# Patient Record
Sex: FEMALE | Race: WHITE | Employment: PART TIME | URBAN - METROPOLITAN AREA
[De-identification: names, ages, dates, MRNs, and addresses within clinical notes are randomized per-mention and may not be internally consistent; named-entity substitution may affect disease eponyms.]

---
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
## 2023-02-08 ENCOUNTER — TELEPHONE (OUTPATIENT)
Dept: OBGYN CLINIC | Age: 18
End: 2023-02-08

## 2023-02-08 RX ORDER — NORETHINDRONE ACETATE AND ETHINYL ESTRADIOL 1MG-20(21)
1 KIT ORAL DAILY
Qty: 3 DOSE PACK | Refills: 0 | Status: SHIPPED | OUTPATIENT
Start: 2023-02-08

## 2023-02-08 NOTE — TELEPHONE ENCOUNTER
Pt called name and  verified. Pt needs birth control prior till her annual exam on 2023 per md order ok to send.

## 2023-02-23 NOTE — PROGRESS NOTES
Annual Exam    Lorena Randall is a 21 y.o. G0 presenting for annual exam.     History of dysmenorrhea, significantly improved since starting Junel OCPs. Pt would like to continue this method. Patient with shorter, light menses and no cramping since starting ocp's. No other concerns today. 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 Has become more health, diet, and exercise conscious. Ob/Gyn Hx:  G0   LMP-2/7/23  Menarche- age 10  Menses- regular   Contraception-Junel OCP  STI- denies  ? SA- never     Health maintenance:  Gardasil- considering, discussed in detail previously      Past Medical History:   Diagnosis Date    Dysmenorrhea in adolescent        No past surgical history on file.     Family History   Problem Relation Age of Onset    No Known Problems Mother     No Known Problems Father     Hypertension Maternal Uncle     Heart Disease Maternal Grandmother        Social History     Socioeconomic History    Marital status: SINGLE     Spouse name: Not on file    Number of children: Not on file    Years of education: Not on file    Highest education level: Not on file   Occupational History    Not on file   Tobacco Use    Smoking status: Never    Smokeless tobacco: Never   Substance and Sexual Activity    Alcohol use: No    Drug use: No    Sexual activity: Never   Other Topics Concern    Not on file   Social History Narrative    Home schooled - 9th grade in 2018        Rides horses, does barrel racing, has 2 horses, one named Bug     Social Determinants of Health     Financial Resource Strain: Not on file   Food Insecurity: Not on file   Transportation Needs: Not on file   Physical Activity: Not on file   Stress: Not on file   Social Connections: Not on file   Intimate Partner Violence: Not on file   Housing Stability: Not on file       Current Outpatient Medications   Medication Sig Dispense Refill    norethindrone-ethinyl estradiol (Aurovela Fe 1-20, 28,) 1 mg-20 mcg (21)/75 mg (7) tab Take 1 Tablet by mouth daily. 3 Dose Pack 0    ibuprofen (MOTRIN) 200 mg tablet Take  by mouth. (Patient not taking: Reported on 12/7/2021)         Allergies   Allergen Reactions    Tree Nut Hives and Swelling       Review of Systems - History obtained from the patient  Constitutional: negative for weight loss, fever, night sweats  HEENT: negative for hearing loss, earache, congestion, snoring, sorethroat  CV: negative for chest pain, palpitations, edema  Resp: negative for cough, shortness of breath, wheezing  GI: negative for change in bowel habits, abdominal pain, black or bloody stools  : negative for frequency, dysuria, hematuria, vaginal discharge, +dysmenorrhea, improved with OCPs  MSK: negative for back pain, joint pain, muscle pain  Breast: negative for breast lumps, nipple discharge, galactorrhea  Skin :negative for itching, rash, hives  Neuro: negative for dizziness, headache, confusion, weakness  Psych: negative for anxiety, depression, change in mood  Heme/lymph: negative for bleeding, bruising, pallor    Physical Exam  Visit Vitals  /66   Ht 5' 5\" (1.651 m)   Wt 139 lb (63 kg)   LMP 02/07/2023 (Approximate)   BMI 23.13 kg/m²       Constitutional  Appearance: well-nourished, well developed, alert, in no acute distress    HENT  Head and Face: appears normal    Chest  Respiratory Effort: non-labored breathing  Auscultation: CTAB, normal breath sounds    Cardiovascular  Heart:   Auscultation: regular rate and rhythm without murmur  Extremities: no peripheral edema    Gastrointestinal  Abdominal Examination: abdomen, soft, non-distended, non-tender to palpation, normal bowel sounds, no masses present  Liver and spleen: no hepatomegaly present, spleen not palpable  Hernias: no hernias identified    Genitourinary: deferred    Skin  General Inspection: no rash, no lesions identified    Neurologic/Psychiatric  Mental Status:  Orientation: grossly oriented to person, place and time  Mood and Affect: mood normal, affect appropriate      Assessment/Plan:  21 y.o. G0 presenting for AE. H/o dysmenorrhea.     Health maintenance:  -diet/exercise/healthy lifestyle  -pap age 22  -STI screen declined as never SA  -gardasil series - information provided previously, pt considering  -cont low-dose OCPs (Ruth Riding), refill today  -cont NSAIDS prn    RTC: 1 year for AE or sooner prn    Carmelita Abad MD  2/24/2023  10:44 AM                                                     .

## 2023-02-24 ENCOUNTER — OFFICE VISIT (OUTPATIENT)
Dept: OBGYN CLINIC | Age: 18
End: 2023-02-24

## 2023-02-24 VITALS
DIASTOLIC BLOOD PRESSURE: 66 MMHG | WEIGHT: 139 LBS | HEIGHT: 65 IN | SYSTOLIC BLOOD PRESSURE: 110 MMHG | BODY MASS INDEX: 23.16 KG/M2

## 2023-02-24 DIAGNOSIS — Z01.419 ENCOUNTER FOR GYNECOLOGICAL EXAMINATION WITHOUT ABNORMAL FINDING: ICD-10-CM

## 2023-02-24 DIAGNOSIS — N94.6 DYSMENORRHEA IN ADOLESCENT: Primary | ICD-10-CM

## 2023-02-24 PROCEDURE — 99395 PREV VISIT EST AGE 18-39: CPT | Performed by: OBSTETRICS & GYNECOLOGY

## 2023-02-24 RX ORDER — ESCITALOPRAM OXALATE 5 MG/1
5 TABLET ORAL DAILY
COMMUNITY
Start: 2023-01-23

## 2023-02-24 RX ORDER — NORETHINDRONE ACETATE AND ETHINYL ESTRADIOL 1MG-20(21)
1 KIT ORAL DAILY
Qty: 3 DOSE PACK | Refills: 3 | Status: SHIPPED | OUTPATIENT
Start: 2023-02-24

## 2024-04-24 RX ORDER — NORETHINDRONE ACETATE AND ETHINYL ESTRADIOL 1MG-20(21)
1 KIT ORAL DAILY
Qty: 84 TABLET | OUTPATIENT
Start: 2024-04-24

## 2024-05-02 RX ORDER — NORETHINDRONE ACETATE AND ETHINYL ESTRADIOL 1MG-20(21)
1 KIT ORAL DAILY
Qty: 1 PACKET | Refills: 0 | Status: SHIPPED | OUTPATIENT
Start: 2024-05-02

## 2024-05-02 RX ORDER — NORETHINDRONE ACETATE AND ETHINYL ESTRADIOL 1MG-20(21)
1 KIT ORAL DAILY
Qty: 84 TABLET | OUTPATIENT
Start: 2024-05-02

## 2024-05-13 NOTE — PROGRESS NOTES
Chief Complaint   Patient presents with    Annual Exam     Senthil Faustin is a 19 y.o. G0 presenting for annual exam.  Her main concerns today include: none    She is currently taking Junel 1/20, doing well.  Needs refill.    Never SA    Uninsured    From Dr. Avendano's last note, 02/24/23: annual exam.  History of dysmenorrhea, significantly improved since starting Junel OCPs. Pt would like to continue this method. Patient with shorter, light menses and no cramping since starting ocp's. No other concerns today.     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 Has become more health, diet, and exercise conscious.     Ob/Gyn Hx:  G0   LMP- approx 04/29/24  Menarche- age 11  Menses- regular   Contraception-Junel OCP  STI- denies  ?SA- never      Health maintenance:  Pap- DUE  Gardasil- Never    1. Have you been to the ER, urgent care clinic, or hospitalized since your last visit?No    2. Have you seen or consulted any other health care providers outside of the [facility] since your last visit? No    Patient declines chaperone.    Nila Lund LPN

## 2024-05-14 ENCOUNTER — OFFICE VISIT (OUTPATIENT)
Age: 19
End: 2024-05-14

## 2024-05-14 VITALS
BODY MASS INDEX: 23.01 KG/M2 | WEIGHT: 134.8 LBS | DIASTOLIC BLOOD PRESSURE: 70 MMHG | SYSTOLIC BLOOD PRESSURE: 108 MMHG | HEIGHT: 64 IN

## 2024-05-14 DIAGNOSIS — N94.6 DYSMENORRHEA: Primary | ICD-10-CM

## 2024-05-14 DIAGNOSIS — Z01.419 ENCOUNTER FOR GYNECOLOGICAL EXAMINATION WITHOUT ABNORMAL FINDING: ICD-10-CM

## 2024-05-14 PROCEDURE — 99395 PREV VISIT EST AGE 18-39: CPT | Performed by: OBSTETRICS & GYNECOLOGY

## 2024-05-14 RX ORDER — DEXTROAMPHETAMINE SACCHARATE, AMPHETAMINE ASPARTATE MONOHYDRATE, DEXTROAMPHETAMINE SULFATE AND AMPHETAMINE SULFATE 2.5; 2.5; 2.5; 2.5 MG/1; MG/1; MG/1; MG/1
10 CAPSULE, EXTENDED RELEASE ORAL 2 TIMES DAILY
COMMUNITY
Start: 2024-05-07

## 2024-05-14 RX ORDER — NORETHINDRONE ACETATE AND ETHINYL ESTRADIOL 1MG-20(21)
1 KIT ORAL DAILY
Qty: 3 PACKET | Refills: 4 | Status: SHIPPED | OUTPATIENT
Start: 2024-05-14

## 2024-05-14 SDOH — ECONOMIC STABILITY: INCOME INSECURITY: HOW HARD IS IT FOR YOU TO PAY FOR THE VERY BASICS LIKE FOOD, HOUSING, MEDICAL CARE, AND HEATING?: SOMEWHAT HARD

## 2024-05-14 SDOH — ECONOMIC STABILITY: FOOD INSECURITY: WITHIN THE PAST 12 MONTHS, THE FOOD YOU BOUGHT JUST DIDN'T LAST AND YOU DIDN'T HAVE MONEY TO GET MORE.: NEVER TRUE

## 2024-05-14 SDOH — ECONOMIC STABILITY: HOUSING INSECURITY
IN THE LAST 12 MONTHS, WAS THERE A TIME WHEN YOU DID NOT HAVE A STEADY PLACE TO SLEEP OR SLEPT IN A SHELTER (INCLUDING NOW)?: NO

## 2024-05-14 SDOH — ECONOMIC STABILITY: FOOD INSECURITY: WITHIN THE PAST 12 MONTHS, YOU WORRIED THAT YOUR FOOD WOULD RUN OUT BEFORE YOU GOT MONEY TO BUY MORE.: NEVER TRUE

## 2024-05-14 ASSESSMENT — PATIENT HEALTH QUESTIONNAIRE - PHQ9
2. FEELING DOWN, DEPRESSED OR HOPELESS: NOT AT ALL
SUM OF ALL RESPONSES TO PHQ QUESTIONS 1-9: 0
SUM OF ALL RESPONSES TO PHQ QUESTIONS 1-9: 0
1. LITTLE INTEREST OR PLEASURE IN DOING THINGS: NOT AT ALL
SUM OF ALL RESPONSES TO PHQ QUESTIONS 1-9: 0
SUM OF ALL RESPONSES TO PHQ9 QUESTIONS 1 & 2: 0
SUM OF ALL RESPONSES TO PHQ QUESTIONS 1-9: 0

## 2024-05-14 NOTE — PROGRESS NOTES
Annual Exam    Senthil Faustin is a 19 y.o. G0 presenting for annual exam.     H/o dysmenorrhea, doing well on Junel 1/20, doing well. Needs refill.    Never SA.     Uninsured. 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 Has become more health, diet, and exercise conscious.     Ob/Gyn Hx:  G0   LMP- approx 04/29/24  Menarche- age 11  Menses- regular   Contraception-Junel OCP  STI- denies  ?SA- never      Health maintenance:  Pap- declined today as never SA  Gardasil- Never      Past Medical History:   Diagnosis Date    Dysmenorrhea in adolescent        No past surgical history on file.    Family History   Problem Relation Age of Onset    No Known Problems Mother     No Known Problems Father     Hypertension Maternal Uncle     Heart Disease Maternal Grandmother        Social History     Socioeconomic History    Marital status: SINGLE     Spouse name: Not on file    Number of children: Not on file    Years of education: Not on file    Highest education level: Not on file   Occupational History    Not on file   Tobacco Use    Smoking status: Never    Smokeless tobacco: Never   Substance and Sexual Activity    Alcohol use: No    Drug use: No    Sexual activity: Never   Other Topics Concern    Not on file   Social History Narrative    Home schooled - 9th grade in 2018        Rides horses, does barrel racing, has 2 horses, one named Bug     Social Determinants of Health     Financial Resource Strain: Not on file   Food Insecurity: Not on file   Transportation Needs: Not on file   Physical Activity: Not on file   Stress: Not on file   Social Connections: Not on file   Intimate Partner Violence: Not on file   Housing Stability: Not on file       Current Outpatient Medications   Medication Sig Dispense Refill    norethindrone-ethinyl estradiol (Aurovela Fe 1-20, 28,) 1 mg-20 mcg (21)/75 mg (7) tab Take 1

## 2024-08-15 ENCOUNTER — OFFICE VISIT (OUTPATIENT)
Age: 19
End: 2024-08-15

## 2024-08-15 VITALS
HEART RATE: 70 BPM | HEIGHT: 64 IN | SYSTOLIC BLOOD PRESSURE: 103 MMHG | RESPIRATION RATE: 18 BRPM | OXYGEN SATURATION: 96 % | WEIGHT: 132 LBS | DIASTOLIC BLOOD PRESSURE: 68 MMHG | BODY MASS INDEX: 22.53 KG/M2 | TEMPERATURE: 98.2 F

## 2024-08-15 DIAGNOSIS — R09.81 CONGESTION OF NASAL SINUS: Primary | ICD-10-CM

## 2024-08-15 DIAGNOSIS — J01.90 ACUTE VIRAL SINUSITIS: ICD-10-CM

## 2024-08-15 DIAGNOSIS — H61.23 IMPACTED CERUMEN OF BOTH EARS: ICD-10-CM

## 2024-08-15 DIAGNOSIS — B97.89 ACUTE VIRAL SINUSITIS: ICD-10-CM

## 2024-08-15 LAB
Lab: NORMAL
QC PASS/FAIL: NORMAL
SARS-COV-2 RDRP RESP QL NAA+PROBE: NEGATIVE

## 2024-08-15 NOTE — PATIENT INSTRUCTIONS
Thank you for visiting [facility] today.    Nasal Congestion:  Flonase (over the counter) nasal spray, once a day  Saline irrigation kits help wash out sinuses 1-2 times a day  Normal saline nasal spray  Afrin nasal spray for no longer than 3-5 days  Congestion:  For thick mucus, take Mucinex (with Guafenesin only) to help thin the mucus.  Follow instructions on the box.  You will need to drink plenty of water with this medication.  Sore Throat:  Lozenges, as needed. Cepacol lozenges will help numb the throat  Chloraseptic spray also helps to numb throat pain  Salt water gargles to soothe throat pain  Sinus pain/pressure:  Warm, wet towel on face to help with facial sinus pain/pressure  Headache/Pain Fever/Body Aches:  If you can take NSAIDs, take Ibuprofen 400-800mg every 8 hours as needed  If you cannot take NSAIDs, take Tylenol 325-500mg every 6 hours as needed  Miscellanous:  Zyrtec/Xyzal/Allegra/Claritin during the day or Benadryl at night may help with allergies.  You may use the decongestant version of these medications as well.  Simple foods like chicken noodle soup, smoothies, hot tea with lemon and honey may also help    Please follow up with your primary care provider if your symptoms last more than 10 days or worsen.    Please go immediately to the Emergency Department if you develop:  Fever higher than 102F (38.9C), sudden and severe pain in the face and head, trouble seeing or seeing double, trouble thinking clearly, swelling or redness around one or both eyes or a stiff neck

## 2024-08-15 NOTE — PROGRESS NOTES
evidence for tympanic membrane rupture. There is no mastoid swelling. There is no evidence for otitis externa or otitis media based on examination. There is no suggestion of systemic complications or sepsis.    -Discharged patient with instructions to follow up with ENT for recurrent symptoms  -Advised to go immediately to the ED for worsening or persistent symptoms    -Provided educational material and patient instructions  -Discharged patient with instructions to follow up with PCP  -Advised to go immediately to the ED for worsening or persistent symptoms      Follow up:  No follow-ups on file.  Follow up immediately for any new, worsening or changes or if symptoms are not improving over the next 5-7 days.     SUBJECTIVE/OBJECTIVE:  1-year-old female presents with mother for concern of sore throat and nasal congestion with bilateral ear pressure for the past 2 days.  4 days ago she was traveling by airplane from [place].  She denies any fevers or known sick contacts.           Diagnoses and all orders for this visit:  Congestion of nasal sinus  -     POCT COVID-19 Rapid, NAAT  Impacted cerumen of both ears  Acute viral sinusitis      Pharyngitis (Sore throat - Tuesday night to now), Congestion (Congestions since Wednesday), and Otalgia (Ear pain and pressure - flew in from [place] on Sunday - Covid positive 6 weeks ago)      No results found for any visits on 08/15/24.      XR Results (most recent):  @BSHSILASTIMGCAT(GSK0890:1)@     EAR CERUMEN REMOVAL    Date/Time: 8/15/2024 1:57 PM    Performed by: Enoch Cordero  Authorized by: Jamir Spicer APRN - CNP    Anesthesia:  Local Anesthetic: none  Location details: right ear and left ear  Patient tolerance: patient tolerated the procedure well with no immediate complications  Procedure type: irrigation   Sedation:  Patient sedated: no              Physical Exam  Constitutional:       Appearance: Normal appearance.   HENT:      Head: Normocephalic and atraumatic.

## 2025-06-23 RX ORDER — NORETHINDRONE ACETATE AND ETHINYL ESTRADIOL 1MG-20(21)
1 KIT ORAL DAILY
Qty: 84 TABLET | OUTPATIENT
Start: 2025-06-23

## 2025-07-28 RX ORDER — NORETHINDRONE ACETATE AND ETHINYL ESTRADIOL 1MG-20(21)
1 KIT ORAL DAILY
Qty: 28 TABLET | OUTPATIENT
Start: 2025-07-28

## 2025-07-28 RX ORDER — NORETHINDRONE ACETATE AND ETHINYL ESTRADIOL 1MG-20(21)
1 KIT ORAL DAILY
Qty: 1 PACKET | Refills: 4 | Status: SHIPPED | OUTPATIENT
Start: 2025-07-28